# Patient Record
Sex: MALE | Race: BLACK OR AFRICAN AMERICAN | NOT HISPANIC OR LATINO | Employment: FULL TIME | URBAN - METROPOLITAN AREA
[De-identification: names, ages, dates, MRNs, and addresses within clinical notes are randomized per-mention and may not be internally consistent; named-entity substitution may affect disease eponyms.]

---

## 2021-12-05 ENCOUNTER — HOSPITAL ENCOUNTER (EMERGENCY)
Facility: HOSPITAL | Age: 21
Discharge: HOME | End: 2021-12-05
Attending: EMERGENCY MEDICINE | Admitting: EMERGENCY MEDICINE

## 2021-12-05 VITALS
SYSTOLIC BLOOD PRESSURE: 125 MMHG | HEART RATE: 57 BPM | TEMPERATURE: 98.6 F | DIASTOLIC BLOOD PRESSURE: 65 MMHG | RESPIRATION RATE: 18 BRPM | OXYGEN SATURATION: 99 %

## 2021-12-05 DIAGNOSIS — K08.89 DENTALGIA: Primary | ICD-10-CM

## 2021-12-05 PROCEDURE — 99282 EMERGENCY DEPT VISIT SF MDM: CPT

## 2021-12-05 PROCEDURE — 96372 THER/PROPH/DIAG INJ SC/IM: CPT

## 2021-12-05 PROCEDURE — 99284 EMERGENCY DEPT VISIT MOD MDM: CPT | Performed by: EMERGENCY MEDICINE

## 2021-12-05 RX ORDER — KETOROLAC TROMETHAMINE 30 MG/ML
15 INJECTION, SOLUTION INTRAMUSCULAR; INTRAVENOUS ONCE
Status: COMPLETED | OUTPATIENT
Start: 2021-12-05 | End: 2021-12-05

## 2021-12-05 RX ORDER — CHLORHEXIDINE GLUCONATE 0.12 MG/ML
15 RINSE ORAL 2 TIMES DAILY
Qty: 210 ML | Refills: 0 | Status: SHIPPED | OUTPATIENT
Start: 2021-12-05 | End: 2021-12-12

## 2021-12-05 RX ADMIN — KETOROLAC TROMETHAMINE 15 MG: 30 INJECTION, SOLUTION INTRAMUSCULAR at 02:10

## 2021-12-07 ENCOUNTER — OFFICE VISIT (OUTPATIENT)
Dept: DENTISTRY | Facility: CLINIC | Age: 21
End: 2021-12-07

## 2021-12-07 VITALS — DIASTOLIC BLOOD PRESSURE: 76 MMHG | SYSTOLIC BLOOD PRESSURE: 144 MMHG | TEMPERATURE: 97.3 F | HEART RATE: 54 BPM

## 2021-12-07 DIAGNOSIS — Z01.21 ENCOUNTER FOR DENTAL EXAMINATION AND CLEANING WITH ABNORMAL FINDINGS: Primary | ICD-10-CM

## 2021-12-07 PROCEDURE — D0140 LIMITED ORAL EVALUATION - PROBLEM FOCUSED: HCPCS | Performed by: DENTIST

## 2021-12-07 PROCEDURE — D0330 PANORAMIC RADIOGRAPHIC IMAGE: HCPCS | Performed by: DENTIST

## 2021-12-07 PROCEDURE — D0220 INTRAORAL - PERIAPICAL FIRST RADIOGRAPHIC IMAGE: HCPCS | Performed by: DENTIST

## 2021-12-07 RX ORDER — AMOXICILLIN 500 MG/1
500 CAPSULE ORAL EVERY 8 HOURS SCHEDULED
Qty: 21 CAPSULE | Refills: 0 | Status: SHIPPED | OUTPATIENT
Start: 2021-12-07 | End: 2021-12-14

## 2023-02-05 ENCOUNTER — HOSPITAL ENCOUNTER (EMERGENCY)
Facility: HOSPITAL | Age: 23
Discharge: HOME | End: 2023-02-05
Attending: EMERGENCY MEDICINE | Admitting: EMERGENCY MEDICINE

## 2023-02-05 VITALS
TEMPERATURE: 98 F | WEIGHT: 142 LBS | HEART RATE: 64 BPM | RESPIRATION RATE: 16 BRPM | DIASTOLIC BLOOD PRESSURE: 74 MMHG | SYSTOLIC BLOOD PRESSURE: 125 MMHG | OXYGEN SATURATION: 99 %

## 2023-02-05 DIAGNOSIS — R07.89 CHEST WALL PAIN: Primary | ICD-10-CM

## 2023-02-05 RX ORDER — LIDOCAINE 50 MG/G
1 PATCH TOPICAL ONCE
Status: DISCONTINUED | OUTPATIENT
Start: 2023-02-05 | End: 2023-02-05 | Stop reason: HOSPADM

## 2023-02-05 RX ORDER — IBUPROFEN 600 MG/1
600 TABLET ORAL ONCE
Status: COMPLETED | OUTPATIENT
Start: 2023-02-05 | End: 2023-02-05

## 2023-02-05 RX ADMIN — IBUPROFEN 600 MG: 600 TABLET, FILM COATED ORAL at 19:38

## 2023-02-05 RX ADMIN — LIDOCAINE 5% 1 PATCH: 700 PATCH TOPICAL at 19:38

## 2023-02-05 NOTE — Clinical Note
Richard Hodgkins was seen and treated in our emergency department on 2/5/2023  No restrictions    Other - See Comments    N/A    Diagnosis: Right chest wall pain    Riki Butts  is off the rest of the shift today, may return to work on return date  He may return on this date: 02/07/2023         If you have any questions or concerns, please don't hesitate to call        Kitri Junior MD    ______________________________           _______________          _______________  JERRY NOVAK EMILY [facility] Representative                              Date                                Time

## 2023-02-06 LAB
ATRIAL RATE: 53 BPM
P AXIS: 61 DEGREES
PR INTERVAL: 168 MS
QRS AXIS: 66 DEGREES
QRSD INTERVAL: 82 MS
QT INTERVAL: 390 MS
QTC INTERVAL: 365 MS
T WAVE AXIS: 75 DEGREES
VENTRICULAR RATE: 53 BPM

## 2023-02-06 NOTE — ED PROVIDER NOTES
History  Chief Complaint   Patient presents with   • Chest Pain     Pt reports right sided CP that started yesterday  Pt was watching tv when it first started  Pt reports pain worsens when he bends over, moves his right arm  Pt reports SOB that started today     22 y/o male presents to the ED for evaluation of right-sided chest discomfort that started yesterday  The patient reports that he was watching TV last night when he started experiencing right-sided chest discomfort  The pain is worse when he leans to the right side  He experienced transient dyspnea yesterday that has since resolved  He denies any direct trauma, falls, or other injury  No fever, chills, cough, current dyspnea, abdominal pain, nausea, vomiting, diarrhea, back pain, neck pain, headache, numbness, or weakness  None       No past medical history on file  No past surgical history on file  No family history on file  I have reviewed and agree with the history as documented  E-Cigarette/Vaping     E-Cigarette/Vaping Substances     Social History     Tobacco Use   • Smoking status: Never   • Smokeless tobacco: Never   Substance Use Topics   • Alcohol use: Never   • Drug use: Never        Review of Systems   Constitutional: Negative for chills and fever  HENT: Negative for congestion, rhinorrhea and sore throat  Respiratory: Negative for cough and shortness of breath  Cardiovascular: Negative for palpitations  Gastrointestinal: Negative for abdominal pain, diarrhea, nausea and vomiting  Genitourinary: Negative for dysuria and hematuria  Musculoskeletal: Negative for back pain and neck pain  Right-sided chest wall pain  Neurological: Negative for weakness, light-headedness, numbness and headaches  All other systems reviewed and are negative        Physical Exam  ED Triage Vitals [02/05/23 1755]   Temperature Pulse Respirations Blood Pressure SpO2   98 °F (36 7 °C) 64 18 140/85 97 %      Temp Source Heart Rate Source Patient Position - Orthostatic VS BP Location FiO2 (%)   Oral Monitor Sitting Left arm --      Pain Score       7             Orthostatic Vital Signs  Vitals:    02/05/23 1755 02/05/23 1845   BP: 140/85 125/74   Pulse: 64 64   Patient Position - Orthostatic VS: Sitting        Physical Exam  Vitals and nursing note reviewed  Constitutional:       General: He is not in acute distress  Appearance: Normal appearance  He is normal weight  HENT:      Head: Normocephalic and atraumatic  Right Ear: External ear normal       Left Ear: External ear normal       Nose: Nose normal       Mouth/Throat:      Mouth: Mucous membranes are moist       Pharynx: Oropharynx is clear  No oropharyngeal exudate or posterior oropharyngeal erythema  Eyes:      Extraocular Movements: Extraocular movements intact  Conjunctiva/sclera: Conjunctivae normal       Pupils: Pupils are equal, round, and reactive to light  Cardiovascular:      Rate and Rhythm: Normal rate and regular rhythm  Pulses: Normal pulses  Heart sounds: Normal heart sounds  Pulmonary:      Effort: Pulmonary effort is normal  No respiratory distress  Breath sounds: Normal breath sounds  No wheezing or rales  Chest:      Comments: The patient has point tenderness over the right upper anteriolateral chest wall, with no palpable deformity or crepitus  Abdominal:      General: Abdomen is flat  Bowel sounds are normal  There is no distension  Palpations: Abdomen is soft  Tenderness: There is no abdominal tenderness  There is no right CVA tenderness, left CVA tenderness or guarding  Musculoskeletal:         General: No swelling or tenderness  Normal range of motion  Cervical back: Normal range of motion and neck supple  No tenderness  Right lower leg: No tenderness  No edema  Left lower leg: No tenderness  No edema  Skin:     General: Skin is warm and dry     Neurological:      General: No focal deficit present  Mental Status: He is alert and oriented to person, place, and time  ED Medications  Medications   lidocaine (LIDODERM) 5 % patch 1 patch (1 patch Topical Medication Applied 2/5/23 1938)   ibuprofen (MOTRIN) tablet 600 mg (600 mg Oral Given 2/5/23 1938)       Diagnostic Studies  Results Reviewed     None                 No orders to display         Procedures  Procedures      ED Course  ED Course as of 02/05/23 1943   Gurjit Safe Feb 05, 2023   5643 Procedure Note: EKG  Date/Time: 02/05/23 6:34 PM   Interpreted by: Masoud Weiss MD  Indications / Diagnosis: Right chest wall pain  ECG reviewed by me, the ED Physician: yes   The EKG demonstrates:  Rhythm: sinus bradycardia 53 bpm  Intervals: normal intervals  Axis: normal axis  QRS/Blocks: normal QRS  ST Changes: Nonspecific ST-T wave changes  No STEMI  No prior ECG available for comparison  Medical Decision Making  22 y/o male presents to the ED for evaluation of right-sided chest discomfort that started yesterday  The patient reports that he was watching TV last night when he started experiencing right-sided chest discomfort  The pain is worse when he leans to the right side  He experienced transient dyspnea yesterday that has since resolved  He denies any direct trauma, falls, or other injury  No fever, chills, cough, current dyspnea, abdominal pain, nausea, vomiting, diarrhea, back pain, neck pain, headache, numbness, or weakness  On exam the patient is afebrile, VSS, appears in no acute distress  Nontoxic  No diaphoresis  Heart with regular rate and rhythm, lungs are clear to auscultation bilaterally  The patient has point tenderness over the right upper anteriolateral chest wall, with no palpable deformity or crepitus  No lower extremity edema  The patient appears to have musculoskeletal chest wall pain  No history of DVT/PE and no signs or symptoms of acute DVT    Will evaluate with ECG and treat symptomatically  Low risk for chest pain given age and lack of risk factors  ECG normal on my interpretation  I discussed all findings, treatment, red flags/return precautions, and outpatient follow-up and the patient/family understand and agree  Stable for discharge  Chest wall pain: acute illness or injury  Risk  Prescription drug management  Disposition  Final diagnoses:   Chest wall pain     Time reflects when diagnosis was documented in both MDM as applicable and the Disposition within this note     Time User Action Codes Description Comment    2/5/2023  7:42 PM Grady Zaidi Add [R07 [id number]] Chest wall pain       ED Disposition     ED Disposition   Discharge    Condition   Stable    Date/Time   Sun Feb 5, 2023  7:42 PM    Comment   Erickson Mendez discharge to home/self care  Follow-up Information     Follow up With Specialties Details Why Contact Info Additional [address] [facility] Internal Medicine Call  As needed [address] [place] [address] [address] [facility] [address]  [facility] [address], [address] [facility] [address], [place], [place], [address]   [facility] Emergency Department Emergency Medicine Go to  If symptoms worsen [address] [address]  [address] [facility] [address] [facility] Emergency Department, [address], [address] [phone]          Patient's Medications    No medications on file     No discharge procedures on file  PDMP Review     None           ED Provider  Attending physically available and evaluated Iraida Tejada  I managed the patient along with the ED Attending      Electronically Signed by         Jasmyne Hernandez MD  02/16/23 9629

## 2023-02-06 NOTE — ED ATTENDING ATTESTATION
2/5/2023  IErnestina DO, saw and evaluated the patient  I have discussed the patient with the resident/non-physician practitioner and agree with the resident's/non-physician practitioner's findings, Plan of Care, and MDM as documented in the resident's/non-physician practitioner's note, except where noted  All available labs and Radiology studies were reviewed  I was present for key portions of any procedure(s) performed by the resident/non-physician practitioner and I was immediately available to provide assistance  At this point I agree with the current assessment done in the Emergency Department  I have conducted an independent evaluation of this patient a history and physical is as follows:    24 yo male c/o 1d hx R sided chest pain last night while watching TV  Worse when he leans over to the R  Had some transient dyspnea, currently resolved  Does c/o continuing chest pain  Has pinpoint tenderness to R anterior axillary line around T3-T4  No hx of VTE  No leg pain or swelling  No other c/o at this time  Denies vomiting, diaphoresis  Imp: R sided chest pain likely MSK plan: ECG  Symptomatic tx         ED Course         Critical Care Time  Procedures